# Patient Record
Sex: MALE | Race: OTHER | NOT HISPANIC OR LATINO | URBAN - METROPOLITAN AREA
[De-identification: names, ages, dates, MRNs, and addresses within clinical notes are randomized per-mention and may not be internally consistent; named-entity substitution may affect disease eponyms.]

---

## 2017-12-11 ENCOUNTER — OUTPATIENT (OUTPATIENT)
Dept: OUTPATIENT SERVICES | Facility: HOSPITAL | Age: 67
LOS: 1 days | End: 2017-12-11

## 2018-03-19 ENCOUNTER — OUTPATIENT (OUTPATIENT)
Dept: OUTPATIENT SERVICES | Facility: HOSPITAL | Age: 68
LOS: 1 days | End: 2018-03-19
Payer: MEDICARE

## 2018-03-20 ENCOUNTER — OUTPATIENT (OUTPATIENT)
Dept: OUTPATIENT SERVICES | Facility: HOSPITAL | Age: 68
LOS: 1 days | End: 2018-03-20

## 2018-03-20 PROCEDURE — 78014 THYROID IMAGING W/BLOOD FLOW: CPT | Mod: 26

## 2019-09-19 ENCOUNTER — APPOINTMENT (OUTPATIENT)
Dept: ULTRASOUND IMAGING | Facility: CLINIC | Age: 69
End: 2019-09-19
Payer: MEDICARE

## 2019-09-19 PROCEDURE — 76700 US EXAM ABDOM COMPLETE: CPT

## 2019-09-26 ENCOUNTER — APPOINTMENT (OUTPATIENT)
Dept: CT IMAGING | Facility: CLINIC | Age: 69
End: 2019-09-26
Payer: MEDICARE

## 2019-09-26 PROCEDURE — 74177 CT ABD & PELVIS W/CONTRAST: CPT

## 2019-09-26 PROCEDURE — Q9967E: CUSTOM

## 2019-09-26 PROCEDURE — 82565A: CUSTOM

## 2019-10-22 ENCOUNTER — OUTPATIENT (OUTPATIENT)
Dept: OUTPATIENT SERVICES | Facility: HOSPITAL | Age: 69
LOS: 1 days | End: 2019-10-22
Payer: MEDICARE

## 2019-10-22 PROCEDURE — 93010 ELECTROCARDIOGRAM REPORT: CPT

## 2019-11-04 ENCOUNTER — OUTPATIENT (OUTPATIENT)
Dept: OUTPATIENT SERVICES | Facility: HOSPITAL | Age: 69
LOS: 1 days | End: 2019-11-04

## 2019-11-18 ENCOUNTER — TRANSCRIPTION ENCOUNTER (OUTPATIENT)
Age: 69
End: 2019-11-18

## 2020-06-25 PROBLEM — Z00.00 ENCOUNTER FOR PREVENTIVE HEALTH EXAMINATION: Status: ACTIVE | Noted: 2020-06-25

## 2020-06-26 ENCOUNTER — APPOINTMENT (OUTPATIENT)
Dept: ULTRASOUND IMAGING | Facility: CLINIC | Age: 70
End: 2020-06-26
Payer: MEDICARE

## 2020-06-26 PROCEDURE — 76700 US EXAM ABDOM COMPLETE: CPT

## 2020-08-18 ENCOUNTER — APPOINTMENT (OUTPATIENT)
Dept: ULTRASOUND IMAGING | Facility: CLINIC | Age: 70
End: 2020-08-18
Payer: MEDICARE

## 2020-08-18 PROCEDURE — 76536 US EXAM OF HEAD AND NECK: CPT

## 2020-10-06 ENCOUNTER — APPOINTMENT (OUTPATIENT)
Dept: CT IMAGING | Facility: CLINIC | Age: 70
End: 2020-10-06
Payer: MEDICARE

## 2020-10-06 PROCEDURE — Q9967F: CUSTOM

## 2020-10-06 PROCEDURE — 71275 CT ANGIOGRAPHY CHEST: CPT

## 2020-10-08 ENCOUNTER — APPOINTMENT (OUTPATIENT)
Dept: CARDIOLOGY | Facility: CLINIC | Age: 70
End: 2020-10-08
Payer: MEDICARE

## 2020-10-08 ENCOUNTER — NON-APPOINTMENT (OUTPATIENT)
Age: 70
End: 2020-10-08

## 2020-10-08 VITALS
OXYGEN SATURATION: 98 % | WEIGHT: 213 LBS | TEMPERATURE: 97.1 F | DIASTOLIC BLOOD PRESSURE: 86 MMHG | SYSTOLIC BLOOD PRESSURE: 126 MMHG | HEART RATE: 85 BPM | BODY MASS INDEX: 24.65 KG/M2 | HEIGHT: 78 IN

## 2020-10-08 DIAGNOSIS — Z80.7 FAMILY HISTORY OF OTHER MALIGNANT NEOPLASMS OF LYMPHOID, HEMATOPOIETIC AND RELATED TISSUES: ICD-10-CM

## 2020-10-08 DIAGNOSIS — Z87.39 PERSONAL HISTORY OF OTHER DISEASES OF THE MUSCULOSKELETAL SYSTEM AND CONNECTIVE TISSUE: ICD-10-CM

## 2020-10-08 DIAGNOSIS — Z78.9 OTHER SPECIFIED HEALTH STATUS: ICD-10-CM

## 2020-10-08 DIAGNOSIS — Z87.19 PERSONAL HISTORY OF OTHER DISEASES OF THE DIGESTIVE SYSTEM: ICD-10-CM

## 2020-10-08 DIAGNOSIS — Z72.89 OTHER PROBLEMS RELATED TO LIFESTYLE: ICD-10-CM

## 2020-10-08 PROCEDURE — 93000 ELECTROCARDIOGRAM COMPLETE: CPT

## 2020-10-08 PROCEDURE — 99204 OFFICE O/P NEW MOD 45 MIN: CPT | Mod: 25

## 2020-10-08 RX ORDER — DICLOFENAC SODIUM 10 MG/G
1 GEL TOPICAL
Refills: 0 | Status: ACTIVE | COMMUNITY

## 2020-10-08 RX ORDER — FAMOTIDINE 40 MG/1
40 TABLET, FILM COATED ORAL
Refills: 0 | Status: ACTIVE | COMMUNITY

## 2020-10-08 RX ORDER — COLCHICINE 0.6 MG/1
0.6 TABLET, FILM COATED ORAL
Refills: 0 | Status: ACTIVE | COMMUNITY

## 2020-10-08 RX ORDER — IBUPROFEN 800 MG/1
800 TABLET, FILM COATED ORAL
Refills: 0 | Status: ACTIVE | COMMUNITY

## 2020-10-08 RX ORDER — FLUTICASONE PROPIONATE 50 UG/1
50 SPRAY, METERED NASAL
Refills: 0 | Status: ACTIVE | COMMUNITY

## 2020-10-08 RX ORDER — ALLOPURINOL 300 MG/1
300 TABLET ORAL DAILY
Refills: 0 | Status: ACTIVE | COMMUNITY

## 2020-10-08 RX ORDER — FLUOROMETHOLONE ACETATE 1 MG/ML
0.1 SUSPENSION/ DROPS OPHTHALMIC
Refills: 0 | Status: ACTIVE | COMMUNITY

## 2020-10-08 RX ORDER — HYDROXYCHLOROQUINE SULFATE 200 MG/1
200 TABLET, FILM COATED ORAL DAILY
Refills: 0 | Status: ACTIVE | COMMUNITY

## 2020-10-08 RX ORDER — OMEPRAZOLE 20 MG/1
20 CAPSULE, DELAYED RELEASE ORAL
Qty: 90 | Refills: 3 | Status: ACTIVE | COMMUNITY

## 2020-10-08 RX ORDER — PREGABALIN 100 MG/1
100 CAPSULE ORAL
Refills: 0 | Status: ACTIVE | COMMUNITY

## 2020-10-08 RX ORDER — HYDROCODONE BITARTRATE AND ACETAMINOPHEN 7.5; 325 MG/1; MG/1
7.5-325 TABLET ORAL
Refills: 0 | Status: ACTIVE | COMMUNITY

## 2020-10-08 RX ORDER — ZOLPIDEM TARTRATE 10 MG/1
10 TABLET ORAL
Refills: 0 | Status: ACTIVE | COMMUNITY

## 2020-10-08 RX ORDER — CHLORTHALIDONE 25 MG/1
25 TABLET ORAL DAILY
Qty: 90 | Refills: 1 | Status: ACTIVE | COMMUNITY

## 2020-10-08 NOTE — DISCUSSION/SUMMARY
[FreeTextEntry1] : 1. Chest Pain: recommend ETT and echocardiogram. Will attempt to obtain CTA of the chest report that his rheumatologist, Dr. Viridiana Pimentel ordered. \par \par 2. HTN: appears controlled on chlorthalidone 25mg daily. If gout flare ups become frequent in the future, consider avoiding thiazide diuretic and switching to something else that doesn't elevate uric acid levels. \par \par Office will call patient with results. \par \par Follow up in 6 months.

## 2020-10-08 NOTE — HISTORY OF PRESENT ILLNESS
[FreeTextEntry1] : 70 year old male with history of rheumatoid arthritis, gout, HTN, GERD presents for an evaluation for chest pain. Patient states in June 2020, he started to notice chest pain. At times it can be at least moderate in severity. Located over his entire chest and can radiate to his upper back and neck area. The only trigger he noticed is if he is using his upper body, for example, working with tools. While he is using his upper body he feels fine. It is ours later or the next day when the pain occurs. The pain will last 2-3 days but be waxing and waning during that time period. It is described as a tightness and there is no associated SOB or palpitations. His rheumatologist ordered a CTA of the chest to make sure there is no aortic aneurysm. He states he had it done the other day but hasn't heard about the results, so he assumes everything was normal. \par \par There is no history of MI, CVA, CHF, or previous coronary intervention.

## 2020-11-12 ENCOUNTER — APPOINTMENT (OUTPATIENT)
Dept: CARDIOLOGY | Facility: CLINIC | Age: 70
End: 2020-11-12
Payer: MEDICARE

## 2020-11-12 DIAGNOSIS — I10 ESSENTIAL (PRIMARY) HYPERTENSION: ICD-10-CM

## 2020-11-12 DIAGNOSIS — R07.89 OTHER CHEST PAIN: ICD-10-CM

## 2020-11-12 PROCEDURE — 99072 ADDL SUPL MATRL&STAF TM PHE: CPT

## 2020-11-12 PROCEDURE — 93015 CV STRESS TEST SUPVJ I&R: CPT

## 2020-11-12 PROCEDURE — 93306 TTE W/DOPPLER COMPLETE: CPT

## 2021-04-08 ENCOUNTER — APPOINTMENT (OUTPATIENT)
Dept: CARDIOLOGY | Facility: CLINIC | Age: 71
End: 2021-04-08

## 2021-04-12 ENCOUNTER — APPOINTMENT (OUTPATIENT)
Age: 71
End: 2021-04-12
Payer: MEDICARE

## 2021-04-12 PROCEDURE — 0001A: CPT

## 2021-04-28 ENCOUNTER — APPOINTMENT (OUTPATIENT)
Dept: RADIOLOGY | Facility: CLINIC | Age: 71
End: 2021-04-28
Payer: MEDICARE

## 2021-04-28 ENCOUNTER — APPOINTMENT (OUTPATIENT)
Dept: MRI IMAGING | Facility: CLINIC | Age: 71
End: 2021-04-28

## 2021-04-28 PROCEDURE — 72070 X-RAY EXAM THORAC SPINE 2VWS: CPT

## 2021-04-28 PROCEDURE — 72050 X-RAY EXAM NECK SPINE 4/5VWS: CPT

## 2021-04-28 PROCEDURE — 72170 X-RAY EXAM OF PELVIS: CPT

## 2021-04-28 PROCEDURE — 72100 X-RAY EXAM L-S SPINE 2/3 VWS: CPT

## 2021-04-28 PROCEDURE — 72200 X-RAY EXAM SI JOINTS: CPT

## 2021-05-03 ENCOUNTER — APPOINTMENT (OUTPATIENT)
Age: 71
End: 2021-05-03
Payer: MEDICARE

## 2021-05-03 PROCEDURE — 0002A: CPT

## 2021-07-21 ENCOUNTER — APPOINTMENT (OUTPATIENT)
Dept: CT IMAGING | Facility: CLINIC | Age: 71
End: 2021-07-21
Payer: MEDICARE

## 2021-07-21 PROCEDURE — 82565 ASSAY OF CREATININE: CPT | Mod: QW

## 2021-07-21 PROCEDURE — 70492 CT SFT TSUE NCK W/O & W/DYE: CPT

## 2022-04-14 ENCOUNTER — TRANSCRIPTION ENCOUNTER (OUTPATIENT)
Age: 72
End: 2022-04-14

## 2022-04-15 ENCOUNTER — OUTPATIENT (OUTPATIENT)
Dept: OUTPATIENT SERVICES | Facility: HOSPITAL | Age: 72
LOS: 1 days | End: 2022-04-15
Payer: MEDICARE

## 2022-04-15 DIAGNOSIS — R07.89 OTHER CHEST PAIN: ICD-10-CM

## 2022-04-15 PROCEDURE — 91035 G-ESOPH REFLX TST W/ELECTROD: CPT

## 2022-04-15 PROCEDURE — C1889: CPT

## 2022-04-15 DEVICE — IMPLANTABLE DEVICE: Type: IMPLANTABLE DEVICE | Status: FUNCTIONAL

## 2022-09-20 ENCOUNTER — RESULT REVIEW (OUTPATIENT)
Age: 72
End: 2022-09-20

## 2022-09-20 ENCOUNTER — APPOINTMENT (OUTPATIENT)
Dept: RADIOLOGY | Facility: CLINIC | Age: 72
End: 2022-09-20

## 2022-09-20 PROCEDURE — 71100 X-RAY EXAM RIBS UNI 2 VIEWS: CPT | Mod: RT

## 2023-01-27 ENCOUNTER — APPOINTMENT (OUTPATIENT)
Dept: ULTRASOUND IMAGING | Facility: CLINIC | Age: 73
End: 2023-01-27
Payer: MEDICARE

## 2023-01-27 PROCEDURE — 76857 US EXAM PELVIC LIMITED: CPT

## 2023-01-27 PROCEDURE — 76700 US EXAM ABDOM COMPLETE: CPT

## 2023-09-20 ENCOUNTER — APPOINTMENT (OUTPATIENT)
Dept: RADIOLOGY | Facility: CLINIC | Age: 73
End: 2023-09-20
Payer: MEDICARE

## 2023-09-20 PROCEDURE — 77080 DXA BONE DENSITY AXIAL: CPT

## 2024-03-05 ENCOUNTER — OFFICE (OUTPATIENT)
Dept: URBAN - METROPOLITAN AREA CLINIC 38 | Facility: CLINIC | Age: 74
Setting detail: OPHTHALMOLOGY
End: 2024-03-05
Payer: MEDICARE

## 2024-03-05 DIAGNOSIS — H35.54: ICD-10-CM

## 2024-03-05 DIAGNOSIS — H43.393: ICD-10-CM

## 2024-03-05 DIAGNOSIS — H01.001: ICD-10-CM

## 2024-03-05 DIAGNOSIS — H52.4: ICD-10-CM

## 2024-03-05 DIAGNOSIS — H01.004: ICD-10-CM

## 2024-03-05 DIAGNOSIS — H01.005: ICD-10-CM

## 2024-03-05 DIAGNOSIS — H01.002: ICD-10-CM

## 2024-03-05 DIAGNOSIS — H25.13: ICD-10-CM

## 2024-03-05 DIAGNOSIS — H35.033: ICD-10-CM

## 2024-03-05 PROCEDURE — 92015 DETERMINE REFRACTIVE STATE: CPT | Performed by: OPTOMETRIST

## 2024-03-05 PROCEDURE — 92250 FUNDUS PHOTOGRAPHY W/I&R: CPT | Performed by: OPTOMETRIST

## 2024-03-05 PROCEDURE — 92014 COMPRE OPH EXAM EST PT 1/>: CPT | Performed by: OPTOMETRIST

## 2024-03-05 ASSESSMENT — REFRACTION_MANIFEST
OD_CYLINDER: -1.25
OS_VA1: 20/30+
OD_ADD: +2.75
OS_SPHERE: +1.25
OU_VA: 20/30+
OD_CYLINDER: -1.00
OS_CYLINDER: -0.75
OD_AXIS: 097
OS_AXIS: 092
OS_ADD: +2.50
OS_SPHERE: -1.00
OD_SPHERE: +1.50
OD_SPHERE: -1.00
OS_AXIS: 078
OD_VA1: 20/20
OS_ADD: +2.75
OS_VA1: 20/30+2
OS_CYLINDER: -0.75
OD_AXIS: 082
OD_VA1: 20/50
OD_ADD: +2.50

## 2024-03-05 ASSESSMENT — REFRACTION_CURRENTRX
OD_AXIS: 089
OS_AXIS: 082
OD_VPRISM_DIRECTION: PROGS
OS_VPRISM_DIRECTION: PROGS
OD_AXIS: 100
OD_SPHERE: +1.50
OS_CYLINDER: -1.25
OD_VPRISM_DIRECTION: PROGS
OS_ADD: +2.75
OS_VPRISM_DIRECTION: PROGS
OD_SPHERE: -1.00
OD_OVR_VA: 20/
OD_OVR_VA: 20/
OS_OVR_VA: 20/
OS_AXIS: 098
OS_SPHERE: +1.75
OS_CYLINDER: -1.00
OD_ADD: +2.75
OS_ADD: +2.50
OS_SPHERE: -0.50
OD_CYLINDER: -1.00
OS_OVR_VA: 20/
OD_ADD: +2.75
OD_CYLINDER: -1.50

## 2024-03-05 ASSESSMENT — SPHEQUIV_DERIVED
OS_SPHEQUIV: 0.875
OD_SPHEQUIV: 0.875
OS_SPHEQUIV: -1.375
OD_SPHEQUIV: -1.5

## 2024-03-05 ASSESSMENT — LID EXAM ASSESSMENTS
OD_BLEPHARITIS: RLL RUL 2+
OS_BLEPHARITIS: LLL LUL 2+

## 2025-03-04 ENCOUNTER — OFFICE (OUTPATIENT)
Dept: URBAN - METROPOLITAN AREA CLINIC 38 | Facility: CLINIC | Age: 75
Setting detail: OPHTHALMOLOGY
End: 2025-03-04
Payer: MEDICARE

## 2025-03-04 DIAGNOSIS — H25.13: ICD-10-CM

## 2025-03-04 DIAGNOSIS — H35.3131: ICD-10-CM

## 2025-03-04 DIAGNOSIS — H43.393: ICD-10-CM

## 2025-03-04 PROCEDURE — 92134 CPTRZ OPH DX IMG PST SGM RTA: CPT | Performed by: OPHTHALMOLOGY

## 2025-03-04 PROCEDURE — 92014 COMPRE OPH EXAM EST PT 1/>: CPT | Performed by: OPHTHALMOLOGY

## 2025-03-04 ASSESSMENT — REFRACTION_AUTOREFRACTION
OD_SPHERE: -2.75
OS_CYLINDER: -1.00
OD_CYLINDER: -1.75
OD_AXIS: 100
OS_AXIS: 082
OS_SPHERE: -1.75

## 2025-03-04 ASSESSMENT — VISUAL ACUITY
OS_BCVA: 20/60-
OD_BCVA: 20/70

## 2025-03-04 ASSESSMENT — REFRACTION_CURRENTRX
OD_ADD: +3.00
OS_VPRISM_DIRECTION: PROGS
OS_OVR_VA: 20/
OD_AXIS: 089
OD_SPHERE: -3.00
OS_OVR_VA: 20/
OD_AXIS: 100
OD_OVR_VA: 20/
OS_VPRISM_DIRECTION: PROGS
OD_CYLINDER: -1.50
OS_CYLINDER: -1.50
OD_SPHERE: -1.00
OS_SPHERE: -2.25
OD_OVR_VA: 20/
OD_CYLINDER: -1.50
OS_CYLINDER: -1.25
OS_ADD: +2.50
OS_ADD: +3.00
OD_ADD: +2.75
OD_VPRISM_DIRECTION: PROGS
OS_SPHERE: -0.50
OS_AXIS: 082
OS_AXIS: 082
OD_VPRISM_DIRECTION: PROGS

## 2025-03-04 ASSESSMENT — REFRACTION_MANIFEST
OS_SPHERE: +1.25
OS_ADD: +2.75
OS_CYLINDER: -1.50
OD_SPHERE: -2.50
OD_VA1: 20/20
OD_CYLINDER: -1.25
OS_VA2: 20/50(J5)
OS_CYLINDER: -0.75
OS_VA1: 20/50
OS_VA1: 20/30+2
OD_SPHERE: +1.50
OS_AXIS: 092
OD_ADD: +3.00
OD_CYLINDER: -1.25
OS_ADD: +3.00
OS_AXIS: 080
OD_VA1: 20/70-1
OD_ADD: +2.75
OD_AXIS: 100
OU_VA: 20/40-2
OD_AXIS: 082
OD_VA2: 20/50(J5)
OS_SPHERE: -2.00

## 2025-03-04 ASSESSMENT — KERATOMETRY
OD_AXISANGLE_DEGREES: 090
OD_K1POWER_DIOPTERS: 42.25
OS_K1POWER_DIOPTERS: 41.75
OS_AXISANGLE_DEGREES: 026
OD_K2POWER_DIOPTERS: 42.25
METHOD_AUTO_MANUAL: AUTO
OS_K2POWER_DIOPTERS: 42.50

## 2025-03-04 ASSESSMENT — CONFRONTATIONAL VISUAL FIELD TEST (CVF)
OS_FINDINGS: FULL
OD_FINDINGS: FULL

## 2025-05-01 ENCOUNTER — OFFICE (OUTPATIENT)
Dept: URBAN - METROPOLITAN AREA CLINIC 38 | Facility: CLINIC | Age: 75
Setting detail: OPHTHALMOLOGY
End: 2025-05-01
Payer: MEDICARE

## 2025-05-01 DIAGNOSIS — H25.11: ICD-10-CM

## 2025-05-01 DIAGNOSIS — H25.13: ICD-10-CM

## 2025-05-01 PROCEDURE — 92136 OPHTHALMIC BIOMETRY: CPT | Mod: RT | Performed by: OPHTHALMOLOGY

## 2025-05-01 PROCEDURE — 99213 OFFICE O/P EST LOW 20 MIN: CPT | Performed by: OPHTHALMOLOGY

## 2025-05-01 ASSESSMENT — REFRACTION_MANIFEST
OD_VA1: 20/70-1
OS_VA1: 20/50
OS_SPHERE: -2.00
OD_ADD: +3.00
OD_AXIS: 100
OD_CYLINDER: -1.25
OS_ADD: +2.75
OS_AXIS: 080
OD_SPHERE: -2.50
OD_CYLINDER: -1.25
OS_VA2: 20/50(J5)
OS_CYLINDER: -1.50
OS_ADD: +3.00
OD_AXIS: 082
OD_ADD: +2.75
OD_VA2: 20/50(J5)
OS_VA1: 20/30+2
OD_VA1: 20/20
OS_CYLINDER: -0.75
OS_SPHERE: +1.25
OD_SPHERE: +1.50
OS_AXIS: 092
OU_VA: 20/40-2

## 2025-05-01 ASSESSMENT — REFRACTION_CURRENTRX
OS_ADD: +2.50
OS_OVR_VA: 20/
OD_AXIS: 089
OD_ADD: +3.00
OS_SPHERE: -0.50
OS_VPRISM_DIRECTION: PROGS
OS_SPHERE: -2.25
OS_AXIS: 082
OD_SPHERE: -3.00
OD_AXIS: 100
OD_ADD: +2.75
OS_VPRISM_DIRECTION: PROGS
OD_CYLINDER: -1.50
OD_VPRISM_DIRECTION: PROGS
OD_CYLINDER: -1.50
OD_VPRISM_DIRECTION: PROGS
OD_OVR_VA: 20/
OS_CYLINDER: -1.25
OS_OVR_VA: 20/
OD_SPHERE: -1.00
OS_AXIS: 082
OD_OVR_VA: 20/
OS_ADD: +3.00
OS_CYLINDER: -1.50

## 2025-05-01 ASSESSMENT — REFRACTION_AUTOREFRACTION
OS_AXIS: 084
OS_SPHERE: +1.50
OD_SPHERE: -3.00
OD_CYLINDER: -1.25
OS_CYLINDER: -0.75
OD_AXIS: 101

## 2025-05-01 ASSESSMENT — KERATOMETRY
OD_K2POWER_DIOPTERS: 42.25
OD_AXISANGLE_DEGREES: 008
OD_K1POWER_DIOPTERS: 41.75
OS_AXISANGLE_DEGREES: 016
OS_K1POWER_DIOPTERS: 41.50
METHOD_AUTO_MANUAL: AUTO
OS_K2POWER_DIOPTERS: 42.35

## 2025-05-01 ASSESSMENT — TONOMETRY
OS_IOP_MMHG: 14
OD_IOP_MMHG: 16

## 2025-05-01 ASSESSMENT — CONFRONTATIONAL VISUAL FIELD TEST (CVF)
OS_FINDINGS: FULL
OD_FINDINGS: FULL

## 2025-05-01 ASSESSMENT — VISUAL ACUITY
OS_BCVA: 20/60-
OD_BCVA: 20/70

## 2025-05-19 ENCOUNTER — AMBULATORY SURGERY CENTER (OUTPATIENT)
Dept: URBAN - METROPOLITAN AREA SURGERY 4 | Facility: SURGERY | Age: 75
Setting detail: OPHTHALMOLOGY
End: 2025-05-19
Payer: MEDICARE

## 2025-05-19 DIAGNOSIS — H25.11: ICD-10-CM

## 2025-05-19 DIAGNOSIS — H52.221: ICD-10-CM

## 2025-05-19 PROCEDURE — FEMTO PRECISION LASER CATARACT SURGERY: Mod: GY | Performed by: OPHTHALMOLOGY

## 2025-05-19 PROCEDURE — 66984 XCAPSL CTRC RMVL W/O ECP: CPT | Mod: RT | Performed by: OPHTHALMOLOGY

## 2025-05-20 ENCOUNTER — RX ONLY (RX ONLY)
Age: 75
End: 2025-05-20

## 2025-05-20 ENCOUNTER — OFFICE (OUTPATIENT)
Dept: URBAN - METROPOLITAN AREA CLINIC 38 | Facility: CLINIC | Age: 75
Setting detail: OPHTHALMOLOGY
End: 2025-05-20
Payer: MEDICARE

## 2025-05-20 DIAGNOSIS — H11.31: ICD-10-CM

## 2025-05-20 DIAGNOSIS — Z96.1: ICD-10-CM

## 2025-05-20 PROCEDURE — 99024 POSTOP FOLLOW-UP VISIT: CPT | Performed by: OPHTHALMOLOGY

## 2025-05-20 ASSESSMENT — REFRACTION_MANIFEST
OS_VA2: 20/50(J5)
OD_AXIS: 100
OS_AXIS: 092
OD_SPHERE: +1.50
OD_VA2: 20/50(J5)
OD_CYLINDER: -1.25
OS_VA1: 20/30+2
OS_CYLINDER: -1.50
OD_AXIS: 082
OD_ADD: +2.75
OS_VA1: 20/50
OS_SPHERE: -2.00
OU_VA: 20/40-2
OD_CYLINDER: -1.25
OD_VA1: 20/70-1
OD_ADD: +3.00
OS_SPHERE: +1.25
OS_CYLINDER: -0.75
OS_ADD: +2.75
OD_SPHERE: -2.50
OS_ADD: +3.00
OD_VA1: 20/20
OS_AXIS: 080

## 2025-05-20 ASSESSMENT — REFRACTION_CURRENTRX
OS_ADD: +2.50
OS_OVR_VA: 20/
OS_OVR_VA: 20/
OD_AXIS: 100
OD_ADD: +2.75
OD_OVR_VA: 20/
OD_CYLINDER: -1.50
OS_SPHERE: -0.50
OD_SPHERE: -3.00
OS_VPRISM_DIRECTION: PROGS
OD_AXIS: 089
OS_CYLINDER: -1.50
OS_VPRISM_DIRECTION: PROGS
OS_SPHERE: -2.25
OD_CYLINDER: -1.50
OD_VPRISM_DIRECTION: PROGS
OD_ADD: +3.00
OS_AXIS: 082
OD_OVR_VA: 20/
OS_AXIS: 082
OD_VPRISM_DIRECTION: PROGS
OS_ADD: +3.00
OD_SPHERE: -1.00
OS_CYLINDER: -1.25

## 2025-05-20 ASSESSMENT — TONOMETRY
OS_IOP_MMHG: 16
OD_IOP_MMHG: 18

## 2025-05-20 ASSESSMENT — KERATOMETRY
OD_K1POWER_DIOPTERS: 42.00
OS_AXISANGLE_DEGREES: 004
OS_K2POWER_DIOPTERS: 42.25
METHOD_AUTO_MANUAL: AUTO
OS_K1POWER_DIOPTERS: 41.25
OD_AXISANGLE_DEGREES: 038
OD_K2POWER_DIOPTERS: 42.25

## 2025-05-20 ASSESSMENT — REFRACTION_AUTOREFRACTION
OS_SPHERE: -1.50
OS_AXIS: 083
OD_AXIS: 105
OD_CYLINDER: -0.75
OD_SPHERE: +0.50
OS_CYLINDER: -1.25

## 2025-05-20 ASSESSMENT — VISUAL ACUITY
OD_BCVA: 20/400
OS_BCVA: 20/60-

## 2025-05-20 ASSESSMENT — CONFRONTATIONAL VISUAL FIELD TEST (CVF)
OD_FINDINGS: FULL
OS_FINDINGS: FULL

## 2025-05-20 ASSESSMENT — CORNEAL EDEMA CLINICAL DESCRIPTION: OD_CORNEALEDEMA: T @ INCISION

## 2025-05-22 ENCOUNTER — OFFICE (OUTPATIENT)
Dept: URBAN - METROPOLITAN AREA CLINIC 38 | Facility: CLINIC | Age: 75
Setting detail: OPHTHALMOLOGY
End: 2025-05-22
Payer: MEDICARE

## 2025-05-22 DIAGNOSIS — H25.12: ICD-10-CM

## 2025-05-22 PROCEDURE — 92136 OPHTHALMIC BIOMETRY: CPT | Mod: LT | Performed by: OPHTHALMOLOGY

## 2025-05-22 ASSESSMENT — REFRACTION_MANIFEST
OS_SPHERE: -2.00
OD_AXIS: 100
OD_AXIS: 082
OS_SPHERE: +1.25
OS_CYLINDER: -0.75
OS_VA2: 20/50(J5)
OD_VA1: 20/20
OS_AXIS: 080
OS_CYLINDER: -1.50
OD_CYLINDER: -1.25
OD_ADD: +3.00
OS_VA1: 20/30+2
OD_VA1: 20/70-1
OD_SPHERE: -2.50
OD_ADD: +2.75
OU_VA: 20/40-2
OD_SPHERE: +1.50
OS_VA1: 20/50
OS_ADD: +2.75
OS_AXIS: 092
OD_CYLINDER: -1.25
OS_ADD: +3.00
OD_VA2: 20/50(J5)

## 2025-05-22 ASSESSMENT — REFRACTION_CURRENTRX
OD_CYLINDER: -1.50
OS_SPHERE: -0.50
OD_AXIS: 100
OD_VPRISM_DIRECTION: PROGS
OS_OVR_VA: 20/
OD_OVR_VA: 20/
OS_VPRISM_DIRECTION: PROGS
OD_VPRISM_DIRECTION: PROGS
OS_ADD: +3.00
OD_OVR_VA: 20/
OS_AXIS: 082
OD_CYLINDER: -1.50
OS_CYLINDER: -1.50
OS_SPHERE: -2.25
OD_AXIS: 089
OS_VPRISM_DIRECTION: PROGS
OS_OVR_VA: 20/
OD_SPHERE: -3.00
OS_AXIS: 082
OD_ADD: +3.00
OD_SPHERE: -1.00
OS_CYLINDER: -1.25
OD_ADD: +2.75
OS_ADD: +2.50

## 2025-05-22 ASSESSMENT — REFRACTION_AUTOREFRACTION
OS_SPHERE: -1.50
OD_AXIS: 108
OS_CYLINDER: -1.00
OD_SPHERE: +0.50
OD_CYLINDER: -0.75
OS_AXIS: 083

## 2025-05-22 ASSESSMENT — TONOMETRY
OS_IOP_MMHG: 14
OD_IOP_MMHG: 12

## 2025-05-22 ASSESSMENT — VISUAL ACUITY
OD_BCVA: 20/400
OS_BCVA: 20/30-

## 2025-05-22 ASSESSMENT — KERATOMETRY
OS_K2POWER_DIOPTERS: 42.25
OD_AXISANGLE_DEGREES: 052
OS_K1POWER_DIOPTERS: 41.25
OS_AXISANGLE_DEGREES: 012
METHOD_AUTO_MANUAL: AUTO
OD_K1POWER_DIOPTERS: 41.75
OD_K2POWER_DIOPTERS: 42.25

## 2025-05-22 ASSESSMENT — CONFRONTATIONAL VISUAL FIELD TEST (CVF)
OD_FINDINGS: FULL
OS_FINDINGS: FULL

## 2025-05-22 ASSESSMENT — CORNEAL EDEMA CLINICAL DESCRIPTION: OD_CORNEALEDEMA: T @ INCISION

## 2025-05-27 ENCOUNTER — AMBULATORY SURGERY CENTER (OUTPATIENT)
Dept: URBAN - METROPOLITAN AREA SURGERY 4 | Facility: SURGERY | Age: 75
Setting detail: OPHTHALMOLOGY
End: 2025-05-27
Payer: MEDICARE

## 2025-05-27 DIAGNOSIS — H52.222: ICD-10-CM

## 2025-05-27 DIAGNOSIS — H25.12: ICD-10-CM

## 2025-05-27 PROCEDURE — FEMTO PRECISION LASER CATARACT SURGERY: Mod: GY | Performed by: OPHTHALMOLOGY

## 2025-05-27 PROCEDURE — 66984 XCAPSL CTRC RMVL W/O ECP: CPT | Mod: 79,LT | Performed by: OPHTHALMOLOGY

## 2025-05-28 ENCOUNTER — RX ONLY (RX ONLY)
Age: 75
End: 2025-05-28

## 2025-05-28 ENCOUNTER — OFFICE (OUTPATIENT)
Dept: URBAN - METROPOLITAN AREA CLINIC 38 | Facility: CLINIC | Age: 75
Setting detail: OPHTHALMOLOGY
End: 2025-05-28
Payer: MEDICARE

## 2025-05-28 DIAGNOSIS — H25.11: ICD-10-CM

## 2025-05-28 DIAGNOSIS — H25.12: ICD-10-CM

## 2025-05-28 PROCEDURE — 99024 POSTOP FOLLOW-UP VISIT: CPT | Performed by: OPHTHALMOLOGY

## 2025-05-28 ASSESSMENT — REFRACTION_CURRENTRX
OD_ADD: +2.75
OD_AXIS: 100
OS_SPHERE: -0.50
OS_OVR_VA: 20/
OS_SPHERE: -2.25
OD_VPRISM_DIRECTION: PROGS
OS_VPRISM_DIRECTION: PROGS
OS_VPRISM_DIRECTION: PROGS
OD_OVR_VA: 20/
OD_OVR_VA: 20/
OD_SPHERE: -1.00
OS_CYLINDER: -1.25
OD_SPHERE: -3.00
OD_CYLINDER: -1.50
OD_CYLINDER: -1.50
OD_VPRISM_DIRECTION: PROGS
OS_AXIS: 082
OS_ADD: +2.50
OD_AXIS: 089
OS_ADD: +3.00
OS_CYLINDER: -1.50
OS_AXIS: 082
OS_OVR_VA: 20/
OD_ADD: +3.00

## 2025-05-28 ASSESSMENT — REFRACTION_MANIFEST
OS_CYLINDER: -1.50
OD_VA2: 20/50(J5)
OS_ADD: +2.75
OS_ADD: +3.00
OD_CYLINDER: -1.25
OS_SPHERE: -2.00
OD_AXIS: 100
OS_AXIS: 092
OD_CYLINDER: -1.25
OD_VA1: 20/20
OD_ADD: +2.75
OS_CYLINDER: -0.75
OD_ADD: +3.00
OD_VA1: 20/70-1
OS_SPHERE: +1.25
OS_VA1: 20/30+2
OS_VA2: 20/50(J5)
OD_SPHERE: +1.50
OS_AXIS: 080
OU_VA: 20/40-2
OS_VA1: 20/50
OD_AXIS: 082
OD_SPHERE: -2.50

## 2025-05-28 ASSESSMENT — CORNEAL EDEMA CLINICAL DESCRIPTION: OS_CORNEALEDEMA: T @ INCISION

## 2025-05-28 ASSESSMENT — KERATOMETRY
OS_AXISANGLE_DEGREES: 112
OS_K2POWER_DIOPTERS: 42.00
OD_K1POWER_DIOPTERS: 41.50
OD_K2POWER_DIOPTERS: 42.25
OS_K1POWER_DIOPTERS: 41.75
OD_AXISANGLE_DEGREES: 036
METHOD_AUTO_MANUAL: AUTO

## 2025-05-28 ASSESSMENT — REFRACTION_AUTOREFRACTION
OS_CYLINDER: -0.25
OS_SPHERE: +0.75
OS_AXIS: 056
OD_SPHERE: +0.75
OD_CYLINDER: -1.00
OD_AXIS: 110

## 2025-05-28 ASSESSMENT — CONFRONTATIONAL VISUAL FIELD TEST (CVF)
OS_FINDINGS: FULL
OD_FINDINGS: FULL

## 2025-05-28 ASSESSMENT — VISUAL ACUITY
OD_BCVA: 20/50-1
OS_BCVA: 20/25

## 2025-05-28 ASSESSMENT — TONOMETRY
OS_IOP_MMHG: 16
OD_IOP_MMHG: 15

## 2025-06-10 ENCOUNTER — OFFICE (OUTPATIENT)
Dept: URBAN - METROPOLITAN AREA CLINIC 38 | Facility: CLINIC | Age: 75
Setting detail: OPHTHALMOLOGY
End: 2025-06-10
Payer: MEDICARE

## 2025-06-10 DIAGNOSIS — T15.12XA: ICD-10-CM

## 2025-06-10 DIAGNOSIS — H02.012: ICD-10-CM

## 2025-06-10 DIAGNOSIS — H11.31: ICD-10-CM

## 2025-06-10 PROCEDURE — 67820 REVISE EYELASHES: CPT | Mod: 79,E4 | Performed by: OPTOMETRIST

## 2025-06-10 PROCEDURE — 65210 REMOVE FOREIGN BODY FROM EYE: CPT | Mod: 79,51,LT | Performed by: OPTOMETRIST

## 2025-06-10 PROCEDURE — 99213 OFFICE O/P EST LOW 20 MIN: CPT | Mod: 24,25 | Performed by: OPTOMETRIST

## 2025-06-10 ASSESSMENT — REFRACTION_MANIFEST
OS_AXIS: 092
OS_CYLINDER: -1.50
OD_CYLINDER: -1.25
OD_VA1: 20/70-1
OD_ADD: +3.00
OS_VA1: 20/50
OD_VA2: 20/50(J5)
OD_CYLINDER: -1.25
OD_AXIS: 082
OS_CYLINDER: -0.75
OS_ADD: +3.00
OU_VA: 20/40-2
OS_SPHERE: +1.25
OS_AXIS: 080
OD_SPHERE: +1.50
OD_SPHERE: -2.50
OS_ADD: +2.75
OS_VA1: 20/30+2
OD_VA1: 20/20
OS_VA2: 20/50(J5)
OD_AXIS: 100
OD_ADD: +2.75
OS_SPHERE: -2.00

## 2025-06-10 ASSESSMENT — REFRACTION_CURRENTRX
OS_ADD: +3.00
OD_OVR_VA: 20/
OD_SPHERE: -1.00
OS_VPRISM_DIRECTION: PROGS
OS_SPHERE: -0.50
OD_OVR_VA: 20/
OS_OVR_VA: 20/
OD_SPHERE: -3.00
OS_CYLINDER: -1.50
OS_AXIS: 082
OS_AXIS: 082
OD_ADD: +2.75
OS_ADD: +2.50
OS_OVR_VA: 20/
OD_VPRISM_DIRECTION: PROGS
OS_SPHERE: -2.25
OD_VPRISM_DIRECTION: PROGS
OD_AXIS: 089
OD_AXIS: 100
OS_CYLINDER: -1.25
OS_VPRISM_DIRECTION: PROGS
OD_ADD: +3.00
OD_CYLINDER: -1.50
OD_CYLINDER: -1.50

## 2025-06-10 ASSESSMENT — KERATOMETRY
OS_K1POWER_DIOPTERS: 41.75
OD_K1POWER_DIOPTERS: 41.50
OD_AXISANGLE_DEGREES: 036
OS_AXISANGLE_DEGREES: 112
OS_K2POWER_DIOPTERS: 42.00
OD_K2POWER_DIOPTERS: 42.25
METHOD_AUTO_MANUAL: AUTO

## 2025-06-10 ASSESSMENT — REFRACTION_AUTOREFRACTION
OS_SPHERE: +0.75
OS_AXIS: 056
OD_SPHERE: +0.75
OD_AXIS: 110
OD_CYLINDER: -1.00
OS_CYLINDER: -0.25

## 2025-06-10 ASSESSMENT — CONFRONTATIONAL VISUAL FIELD TEST (CVF)
OD_FINDINGS: FULL
OS_FINDINGS: FULL

## 2025-06-10 ASSESSMENT — LID EXAM ASSESSMENTS: OD_TRICHIASIS: RLL T

## 2025-06-10 ASSESSMENT — VISUAL ACUITY
OS_BCVA: 20/25
OD_BCVA: 20/25

## 2025-06-10 ASSESSMENT — CORNEAL EDEMA CLINICAL DESCRIPTION: OS_CORNEALEDEMA: T @ INCISION

## 2025-06-26 ENCOUNTER — OFFICE (OUTPATIENT)
Dept: URBAN - METROPOLITAN AREA CLINIC 38 | Facility: CLINIC | Age: 75
Setting detail: OPHTHALMOLOGY
End: 2025-06-26
Payer: MEDICARE

## 2025-06-26 DIAGNOSIS — Z96.1: ICD-10-CM

## 2025-06-26 PROBLEM — H02.012 TRICHIASIS; RIGHT LOWER LID: Status: ACTIVE | Noted: 2025-06-10

## 2025-06-26 PROBLEM — H16.223 DRY EYE SYNDROME K SICCA; BOTH EYES: Status: ACTIVE | Noted: 2025-06-26

## 2025-06-26 PROBLEM — H26.492 POSTERIOR CAPSULAR OPACIFICATION; LEFT EYE: Status: ACTIVE | Noted: 2025-06-26

## 2025-06-26 PROCEDURE — 99024 POSTOP FOLLOW-UP VISIT: CPT | Performed by: OPHTHALMOLOGY

## 2025-06-26 ASSESSMENT — REFRACTION_CURRENTRX
OS_ADD: +2.50
OS_CYLINDER: -1.25
OS_ADD: +3.00
OS_VPRISM_DIRECTION: PROGS
OS_AXIS: 082
OS_SPHERE: -2.25
OD_SPHERE: -1.00
OD_AXIS: 100
OD_VPRISM_DIRECTION: PROGS
OD_OVR_VA: 20/
OD_AXIS: 089
OS_CYLINDER: -1.50
OD_ADD: +3.00
OS_SPHERE: -0.50
OD_ADD: +2.75
OD_SPHERE: -3.00
OS_VPRISM_DIRECTION: PROGS
OD_OVR_VA: 20/
OD_CYLINDER: -1.50
OD_VPRISM_DIRECTION: PROGS
OD_CYLINDER: -1.50
OS_AXIS: 082
OS_OVR_VA: 20/
OS_OVR_VA: 20/

## 2025-06-26 ASSESSMENT — REFRACTION_MANIFEST
OS_VA1: 20/30+2
OD_SPHERE: +0.50
OD_ADD: +2.25
OS_SPHERE: +0.25
OU_VA: 20/20
OS_ADD: +2.25
OS_CYLINDER: -1.50
OD_CYLINDER: -0.75
OS_VA1: 20/25+
OD_AXIS: 115
OD_CYLINDER: -1.25
OS_SPHERE: +1.25
OS_CYLINDER: -0.75
OD_VA1: 20/20
OD_VA1: 20/20
OS_AXIS: 092
OS_VA2: 20/20(J1+)
OD_AXIS: 082
OS_ADD: +2.75
OD_ADD: +2.75
OS_AXIS: 040
OD_SPHERE: +1.50
OD_VA2: 20/20(J1+)

## 2025-06-26 ASSESSMENT — REFRACTION_AUTOREFRACTION
OD_SPHERE: +0.75
OS_CYLINDER: -0.75
OS_SPHERE: +0.50
OD_CYLINDER: -1.00
OD_AXIS: 113
OS_AXIS: 037

## 2025-06-26 ASSESSMENT — TONOMETRY
OS_IOP_MMHG: 11
OD_IOP_MMHG: 13

## 2025-06-26 ASSESSMENT — KERATOMETRY
OS_K1POWER_DIOPTERS: 41.75
OD_K2POWER_DIOPTERS: 42.50
METHOD_AUTO_MANUAL: AUTO
OD_AXISANGLE_DEGREES: 052
OS_K2POWER_DIOPTERS: 42.25
OD_K1POWER_DIOPTERS: 41.75
OS_AXISANGLE_DEGREES: 119

## 2025-06-26 ASSESSMENT — TEAR BREAK UP TIME (TBUT)
OD_TBUT: 8 SEC
OS_TBUT: 8 SEC

## 2025-06-26 ASSESSMENT — LID EXAM ASSESSMENTS: OD_TRICHIASIS: ABSENT

## 2025-06-26 ASSESSMENT — VISUAL ACUITY
OD_BCVA: 20/30+
OS_BCVA: 20/25-2

## 2025-06-26 ASSESSMENT — CONFRONTATIONAL VISUAL FIELD TEST (CVF)
OS_FINDINGS: FULL
OD_FINDINGS: FULL

## 2025-07-25 ENCOUNTER — OFFICE (OUTPATIENT)
Dept: URBAN - METROPOLITAN AREA CLINIC 38 | Facility: CLINIC | Age: 75
Setting detail: OPHTHALMOLOGY
End: 2025-07-25
Payer: MEDICARE

## 2025-07-25 DIAGNOSIS — H10.45: ICD-10-CM

## 2025-07-25 PROCEDURE — 99213 OFFICE O/P EST LOW 20 MIN: CPT | Mod: 24 | Performed by: STUDENT IN AN ORGANIZED HEALTH CARE EDUCATION/TRAINING PROGRAM

## 2025-07-25 ASSESSMENT — REFRACTION_MANIFEST
OS_SPHERE: +1.25
OS_SPHERE: +0.25
OS_AXIS: 092
OD_SPHERE: +0.50
OD_ADD: +2.25
OD_AXIS: 115
OS_AXIS: 040
OS_VA1: 20/30+2
OS_CYLINDER: -0.75
OD_VA2: 20/20(J1+)
OD_AXIS: 082
OU_VA: 20/20
OD_CYLINDER: -1.25
OD_SPHERE: +1.50
OS_VA1: 20/25+
OS_CYLINDER: -1.50
OS_VA2: 20/20(J1+)
OD_CYLINDER: -0.75
OD_VA1: 20/20
OD_VA1: 20/20
OS_ADD: +2.25
OD_ADD: +2.75
OS_ADD: +2.75

## 2025-07-25 ASSESSMENT — REFRACTION_CURRENTRX
OS_ADD: +2.50
OS_VPRISM_DIRECTION: PROGS
OD_ADD: +2.00
OS_AXIS: 082
OS_OVR_VA: 20/
OD_OVR_VA: 20/
OD_VPRISM_DIRECTION: PROGS
OS_VPRISM_DIRECTION: SV
OD_SPHERE: -1.00
OS_SPHERE: -0.50
OD_OVR_VA: 20/
OS_ADD: +2.00
OD_CYLINDER: -1.50
OS_CYLINDER: -1.25
OS_OVR_VA: 20/
OD_ADD: +2.75
OD_AXIS: 100
OD_VPRISM_DIRECTION: SV

## 2025-07-25 ASSESSMENT — TEAR BREAK UP TIME (TBUT)
OS_TBUT: 8 SEC
OD_TBUT: 8 SEC

## 2025-07-25 ASSESSMENT — REFRACTION_AUTOREFRACTION
OD_CYLINDER: -1.00
OS_SPHERE: +0.25
OS_CYLINDER: -0.50
OD_AXIS: 115
OD_SPHERE: +0.75
OS_AXIS: 090

## 2025-07-25 ASSESSMENT — LID EXAM ASSESSMENTS: OD_TRICHIASIS: ABSENT

## 2025-07-25 ASSESSMENT — VISUAL ACUITY
OD_BCVA: 20/30+2
OS_BCVA: 20/25-2

## 2025-07-25 ASSESSMENT — KERATOMETRY
OS_K1POWER_DIOPTERS: 41.50
METHOD_AUTO_MANUAL: AUTO
OS_K2POWER_DIOPTERS: 41.50
OD_K2POWER_DIOPTERS: 41.50
OS_AXISANGLE_DEGREES: 090
OD_AXISANGLE_DEGREES: 090
OD_K1POWER_DIOPTERS: 41.50

## 2025-07-25 ASSESSMENT — CONFRONTATIONAL VISUAL FIELD TEST (CVF)
OD_FINDINGS: FULL
OS_FINDINGS: FULL

## 2025-08-07 ENCOUNTER — RX ONLY (RX ONLY)
Age: 75
End: 2025-08-07

## 2025-08-07 ENCOUNTER — OFFICE (OUTPATIENT)
Dept: URBAN - METROPOLITAN AREA CLINIC 38 | Facility: CLINIC | Age: 75
Setting detail: OPHTHALMOLOGY
End: 2025-08-07
Payer: MEDICARE

## 2025-08-07 DIAGNOSIS — H26.492: ICD-10-CM

## 2025-08-07 DIAGNOSIS — H02.012: ICD-10-CM

## 2025-08-07 DIAGNOSIS — H35.3131: ICD-10-CM

## 2025-08-07 DIAGNOSIS — Z96.1: ICD-10-CM

## 2025-08-07 DIAGNOSIS — H43.393: ICD-10-CM

## 2025-08-07 DIAGNOSIS — Z79.899: ICD-10-CM

## 2025-08-07 DIAGNOSIS — H16.223: ICD-10-CM

## 2025-08-07 PROCEDURE — 99024 POSTOP FOLLOW-UP VISIT: CPT | Performed by: OPHTHALMOLOGY

## 2025-08-07 ASSESSMENT — REFRACTION_MANIFEST
OD_ADD: +2.25
OD_VA1: 20/20
OD_ADD: +2.75
OS_ADD: +2.75
OD_VA2: 20/20(J1+)
OD_CYLINDER: -0.75
OS_VA1: 20/30+2
OS_CYLINDER: -1.50
OS_CYLINDER: -0.75
OS_SPHERE: +1.25
OS_AXIS: 092
OS_SPHERE: +0.25
OD_AXIS: 082
OD_VA1: 20/20
OD_AXIS: 115
OD_SPHERE: +0.50
OD_CYLINDER: -1.25
OS_ADD: +2.25
OS_AXIS: 040
OD_SPHERE: +1.50
OS_VA2: 20/20(J1+)
OU_VA: 20/20
OS_VA1: 20/25+

## 2025-08-07 ASSESSMENT — CONFRONTATIONAL VISUAL FIELD TEST (CVF)
OS_FINDINGS: FULL
OD_FINDINGS: FULL

## 2025-08-07 ASSESSMENT — REFRACTION_CURRENTRX
OD_CYLINDER: -1.50
OS_ADD: +2.00
OD_OVR_VA: 20/
OS_VPRISM_DIRECTION: SV
OS_SPHERE: -0.50
OS_VPRISM_DIRECTION: PROGS
OD_VPRISM_DIRECTION: PROGS
OD_OVR_VA: 20/
OD_ADD: +2.75
OS_CYLINDER: -1.25
OD_VPRISM_DIRECTION: SV
OD_SPHERE: -1.00
OS_OVR_VA: 20/
OD_AXIS: 100
OS_OVR_VA: 20/
OS_ADD: +2.50
OS_AXIS: 082
OD_ADD: +2.00

## 2025-08-07 ASSESSMENT — KERATOMETRY
OS_AXISANGLE_DEGREES: 090
OS_K1POWER_DIOPTERS: 41.50
OD_AXISANGLE_DEGREES: 090
OS_K2POWER_DIOPTERS: 41.50
METHOD_AUTO_MANUAL: AUTO
OD_K1POWER_DIOPTERS: 41.50
OD_K2POWER_DIOPTERS: 41.50

## 2025-08-07 ASSESSMENT — TEAR BREAK UP TIME (TBUT)
OD_TBUT: 6-8 SEC
OS_TBUT: 6-8 SEC

## 2025-08-07 ASSESSMENT — VISUAL ACUITY
OD_BCVA: 20/30+3
OS_BCVA: 20/25-2

## 2025-08-07 ASSESSMENT — REFRACTION_AUTOREFRACTION
OD_SPHERE: +0.75
OD_CYLINDER: -1.00
OS_AXIS: 090
OD_AXIS: 115
OS_SPHERE: +0.25
OS_CYLINDER: -0.50

## 2025-08-07 ASSESSMENT — SUPERFICIAL PUNCTATE KERATITIS (SPK)
OD_SPK: T
OS_SPK: T

## 2025-08-07 ASSESSMENT — LID EXAM ASSESSMENTS: OD_TRICHIASIS: ABSENT

## (undated) DEVICE — VALVE ENDOSCOPE DEFENDO SINGLE USE